# Patient Record
Sex: FEMALE | Race: BLACK OR AFRICAN AMERICAN | URBAN - METROPOLITAN AREA
[De-identification: names, ages, dates, MRNs, and addresses within clinical notes are randomized per-mention and may not be internally consistent; named-entity substitution may affect disease eponyms.]

---

## 2020-08-26 ENCOUNTER — OFFICE VISIT CONVERTED (OUTPATIENT)
Dept: OTOLARYNGOLOGY | Facility: CLINIC | Age: 40
End: 2020-08-26
Attending: NURSE PRACTITIONER

## 2020-08-26 ENCOUNTER — HOSPITAL ENCOUNTER (OUTPATIENT)
Dept: URGENT CARE | Facility: CLINIC | Age: 40
Discharge: HOME | End: 2020-08-26
Attending: EMERGENCY MEDICINE

## 2020-08-27 ENCOUNTER — OFFICE VISIT CONVERTED (OUTPATIENT)
Dept: OTOLARYNGOLOGY | Facility: CLINIC | Age: 40
End: 2020-08-27
Attending: OTOLARYNGOLOGY

## 2020-08-31 ENCOUNTER — OFFICE VISIT CONVERTED (OUTPATIENT)
Dept: OTOLARYNGOLOGY | Facility: CLINIC | Age: 40
End: 2020-08-31
Attending: OTOLARYNGOLOGY

## 2020-12-11 ENCOUNTER — HOSPITAL ENCOUNTER (OUTPATIENT)
Dept: URGENT CARE | Facility: CLINIC | Age: 40
Discharge: HOME | End: 2020-12-11
Attending: NURSE PRACTITIONER

## 2021-05-10 NOTE — H&P
History and Physical      Patient Name: Oliva Jaeger   Patient ID: [id number]   Sex: Female   YOB: 1980    Primary Care Provider: No PCP No PCP Other   Referring Provider: Wilmer White    Visit Date: August 26, 2020    Provider: FIONA Felder   Location: [facility]   Location Address: [address] [place] [address], [address] [place] [address]  [id number]   Location Phone: [phone]          Chief Complaint     1.  Otitis externa, left       History Of Present Illness  Oliva Jaeger is a 40 year old female who presents to the office today as a consult from Wilmer White.      She presents to the clinic today for evaluation of her left ear.  She reports that 4 days ago she noticed some soreness when she used a Q-tip in her left ear that she described as mild.  The following day she began to have pain in the left ear.  This worsened yesterday to the point where she was having some clear otorrhea on the left side and excruciating pain.  She reports that she was unable to sleep last night because of the pain being so severe.  She used a heating pad with little relief of her symptoms.  She was seen by urgent care today who diagnosed her with a left otitis externa and gave her a Rocephin injection as well as a prescription for Augmentin.  She has used 1 dose of neomycin polymyxin hydrocortisone drops but reports that it was very painful to put the drops in.  She feels like her hearing on the left side is muffled.  She denies swimming recently but reports that she often wears ear buds throughout the day.       Past Medical History  Left ear pain         Past Surgical History  *Denies any surgical procedures         Medication List  amoxicillin 875 mg oral tablet; neomycin-polymyxin-HC 3.5-10,000-1 mg/mL-unit/mL-% otic (ear) solution         Allergy List  NO KNOWN DRUG ALLERGIES         Family Medical History  *No Known Family History         Social History  Tobacco (Former)         Review of  "Systems  · Constitutional  o Denies  o : fever, night sweats, weight loss  · Eyes  o Denies  o : discharge from eye, impaired vision  · HENT  o Admits  o : *See HPI  · Cardiovascular  o Denies  o : chest pain, irregular heart beats  · Respiratory  o Denies  o : shortness of breath, wheezing, coughing up blood  · Gastrointestinal  o Denies  o : heartburn, reflux, vomiting blood  · Genitourinary  o Denies  o : frequency  · Integument  o Denies  o : rash, skin dryness  · Neurologic  o Denies  o : seizures, loss of balance, loss of consciousness, dizziness  · Endocrine  o Denies  o : cold intolerance, heat intolerance  · Heme-Lymph  o Denies  o : easy bleeding, anemia      Vitals  Date Time BP Position Site L\R Cuff Size HR RR TEMP (F) WT  HT  BMI kg/m2 BSA m2 O2 Sat HC       08/26/2020 01:31 PM        98 265lbs 6oz 5'  4\" 45.55 2.33           Physical Examination  · Constitutional  o Appearance  o : well developed, well-nourished, alert and in no acute distress, voice clear and strong  · Head and Face  o Head  o :   § Inspection  § : no deformities or lesions  o Face  o :   § Inspection  § : No facial lesions; House-Brackmann I/VI bilaterally  § Palpation  § : No TMJ crepitus nor  muscle tenderness bilaterally  · Eyes  o Vision  o :   § Visual Fields  § : Extraocular movements are intact. No spontaneous or gaze-induced nystagmus.  o Conjunctivae  o : clear  o Sclerae  o : clear  o Pupils and Irises  o : pupils equal, round, and reactive to light.   · Ears, Nose, Mouth and Throat  o Ears  o :   § External Ears  § : appearance within normal limits, no lesions present  § Otoscopic Examination  § : Right external auditory canal and right tympanic membrane appearance within normal limits, without perforations. Middle ear is well aerated. Left auricle is very tender to palpation. Left external auditory canal is edematous and erythematous with some purulent otorrhea present. Otorrhea suctioned under the microscope " and otowick placed in canal. Ciprodex drops instilled  § Hearing  § : intact to conversational voice both ears  o Nose  o :   § External Nose  § : appearance normal  § Intranasal Exam  § : mucosa within normal limits, vestibules normal, no intranasal lesions present, septum midline, sinuses non tender to percussion  o Oral Cavity  o :   § Oral Mucosa  § : oral mucosa normal without pallor or cyanosis  § Lips  § : lip appearance normal  § Teeth  § : normal dentition for age  § Gums  § : gums pink, non-swollen, no bleeding present  § Tongue  § : tongue appearance normal; normal mobility  § Palate  § : hard palate normal, soft palate appearance normal with symmetric mobility  o Throat  o :   § Oropharynx  § : no inflammation or lesions present, tonsils within normal limits  · Neck  o Inspection/Palpation  o : normal appearance, no masses or tenderness, trachea midline; thyroid size normal, nontender, no nodules or masses present on palpation  · Respiratory  o Respiratory Effort  o : breathing unlabored  o Inspection of Chest  o : normal appearance, no retractions  · Lymphatic  o Neck  o : no lymphadenopathy present  o Supraclavicular Nodes  o : no lymphadenopathy present  o Preauricular Nodes  o : no lymphadenopathy present  · Skin and Subcutaneous Tissue  o General Inspection  o : Regarding face and neck - there are no rashes present, no lesions present, and no areas of discoloration  · Neurologic  o Cranial Nerves  o : cranial nerves II-XII are grossly intact bilaterally  o Gait and Station  o : normal gait, able to stand without diffculty  · Psychiatric  o Judgement and Insight  o : judgment and insight intact  o Mood and Affect  o : mood normal, affect appropriate          Assessment  · Other infective otitis externa, left ear     380.10/H60.392    Problems Reconciled  Plan  · Orders  o Cerumen Removal by Provider, Unilateral [facility] ([id number]) - 380.10/H60.392 - 08/26/2020  · Medications  o Ciprodex 0.3-0.1 % otic (ear)  drops,suspension   SIG: instill 3 drops in the affected ear TID X 7 days   DISP: (1) 7.5 ml drop btl with 0 refills  Prescribed on 08/26/2020     o Medications have been Reconciled  o Transition of Care or Provider Policy  · Instructions  o She presents to the clinic today for evaluation of her left ear. She reports that 4 days ago she noticed some soreness when she used a Q-tip in her left ear that she described as mild. The following day she began to have pain in the left ear. This worsened yesterday to the point where she was having some clear otorrhea on the left side and excruciating pain. She reports that she was unable to sleep last night because of the pain being so severe. She used a heating pad with little relief of her symptoms. She was seen by urgent care today who diagnosed her with a left otitis externa and gave her a Rocephin injection as well as a prescription for Augmentin. She has used 1 dose of neomycin polymyxin hydrocortisone drops but reports that it was very painful to put the drops in. She feels like her hearing on the left side is muffled. She denies swimming recently but reports that she often wears ear buds throughout the day. On examination today of the left ear her auricle is very tender to palpation. Left external auditory canal is edematous and erythematous with some purulent otorrhea present I was able to suction this out under the microscope. I did place an otowick in the left ear and instilled Ciprodex drops. She did not tolerate this procedure well and was in a lot of pain around the placement of the otowick. I will have her continue to use Ciprodex drops 3 times daily and see her back in 2 days to reevaluate the ear. I will have her alternate ibuprofen and Tylenol for the pain.  o Electronically Identified Patient Education Materials Provided Electronically            Electronically Signed by: FIONA Felder -Author on August 26, 2020 02:08:16 PM

## 2021-05-13 NOTE — PROGRESS NOTES
"   Progress Note      Patient Name: Oliva Jaeger   Patient ID: [id number]   Sex: Female   YOB: 1980    Primary Care Provider: No PCP No PCP Other   Referring Provider: Wilmer White    Visit Date: August 27, 2020    Provider: Chad García MD   Location: [facility]   Location Address: [facility] [employer] [place], [place]  [id number]          Chief Complaint     \"My ear is killing me.\"       History Of Present Illness  Oliva Jaeger is a 40 year old female who returns today for follow-up of a left otitis externa. She tells me that her severe pain started approximately 3 days ago in her left ear. It has steadily worsened and been associated with otorrhea and muffled hearing. She was seen at an urgent care yesterday where she was given a Rocephin shot and prescribed Augmentin and Polysporin drops. She was then seen by FIONA Felder and placed on Ciprodex after an ear work was placed on the left. She called me last night telling me she was having difficulty sleeping and in severe pain. She also reported that the wick had fallen out on her way home yesterday.       Past Medical History  Left ear pain; Otitis externa of left ear         Past Surgical History  *Denies any surgical procedures         Medication List  amoxicillin 875 mg oral tablet; Ciprodex 0.3-0.1 % otic (ear) drops,suspension; neomycin-polymyxin-HC 3.5-10,000-1 mg/mL-unit/mL-% otic (ear) solution         Allergy List  NO KNOWN DRUG ALLERGIES         Family Medical History  *No Known Family History         Social History  Tobacco (Former)         Review of Systems  · Constitutional  o Denies  o : fever, night sweats, weight loss  · Eyes  o Denies  o : discharge from eye, impaired vision  · HENT  o Admits  o : *See HPI  · Cardiovascular  o Denies  o : chest pain, irregular heart beats  · Respiratory  o Denies  o : shortness of breath, wheezing, coughing up blood  · Gastrointestinal  o Denies  o : " "heartburn, reflux, vomiting blood  · Genitourinary  o Denies  o : frequency  · Integument  o Denies  o : rash, skin dryness  · Neurologic  o Denies  o : seizures, loss of balance, loss of consciousness, dizziness  · Endocrine  o Denies  o : cold intolerance, heat intolerance  · Heme-Lymph  o Denies  o : easy bleeding, anemia      Vitals  Date Time BP Position Site L\R Cuff Size HR RR TEMP (F) WT  HT  BMI kg/m2 BSA m2 O2 Sat HC       08/27/2020 10:30 AM        97.9 262lbs 6oz 5'  4\" 45.04 2.32           Physical Examination  · Constitutional  o Appearance  o : well developed, well-nourished, alert and in no acute distress, voice clear and strong  · Head and Face  o Head  o :   § Inspection  § : no deformities or lesions  o Face  o :   § Inspection  § : No facial lesions; House-Brackmann I/VI bilaterally  § Palpation  § : No TMJ crepitus nor  muscle tenderness bilaterally  · Eyes  o Vision  o :   § Visual Fields  § : Extraocular movements are intact. No spontaneous or gaze-induced nystagmus.  o Conjunctivae  o : clear  o Sclerae  o : clear  o Pupils and Irises  o : pupils equal, round, and reactive to light.   · Ears, Nose, Mouth and Throat  o Ears  o :   § External Ears  § : appearance within normal limits, no lesions present  § Otoscopic Examination  § : Right external auditory canal and tympanic membrane are normal in appearance. Left external auditory canal is edematous and erythematous with medial canal purulence. This was suctioned and 3 otowicks placed. Ciprodex drops were then instilled.  § Hearing  § : intact to conversational voice both ears  o Nose  o :   § External Nose  § : appearance normal  § Intranasal Exam  § : mucosa within normal limits, vestibules normal, no intranasal lesions present, septum midline, sinuses non tender to percussion  o Oral Cavity  o :   § Oral Mucosa  § : oral mucosa normal without pallor or cyanosis  § Lips  § : lip appearance normal  § Teeth  § : normal dentition for " age  § Gums  § : gums pink, non-swollen, no bleeding present  § Tongue  § : tongue appearance normal; normal mobility  § Palate  § : hard palate normal, soft palate appearance normal with symmetric mobility  o Throat  o :   § Oropharynx  § : no inflammation or lesions present, tonsils within normal limits  · Neck  o Inspection/Palpation  o : normal appearance, no masses or tenderness, trachea midline; thyroid size normal, nontender, no nodules or masses present on palpation  · Respiratory  o Respiratory Effort  o : breathing unlabored  o Inspection of Chest  o : normal appearance, normal appearance, no retractions  · Lymphatic  o Neck  o : no lymphadenopathy present  o Supraclavicular Nodes  o : no lymphadenopathy present  o Preauricular Nodes  o : no lymphadenopathy present  · Skin and Subcutaneous Tissue  o General Inspection  o : Regarding face and neck - there are no rashes present, no lesions present, and no areas of discoloration  · Neurologic  o Cranial Nerves  o : cranial nerves II-XII are grossly intact bilaterally  o Gait and Station  o : normal gait, able to stand without diffculty  · Psychiatric  o Judgement and Insight  o : judgment and insight intact  o Mood and Affect  o : mood normal, affect appropriate          Assessment  · Otitis externa     380.10/H60.90    Problems Reconciled  Plan  · Medications  o ibuprofen 800 mg oral tablet   SIG: take 1 tablet (800 mg) by oral route 4 times per day with food for 14 days   DISP: (56) tablets with 0 refills  Prescribed on 08/27/2020     o Medications have been Reconciled  o Transition of Care or Provider Policy  · Instructions  o Impressions and findings were discussed with Mrs. Jaeger at great length. We discussed the pathophysiology and natural history of otitis externa as well as potential causes. 3 otowicks were placed in the left external auditory canal and she will continue with Ciprodex. She was provided Norco and ibuprofen for pain relief. She  understands that oral antibiotics were not helpful for this and that nothing will take the pain away completely. She is going to follow-up on Monday for potential debridement and follow-up.            Electronically Signed by: Chad García MD -Author on August 27, 2020 11:22:43 AM

## 2021-05-13 NOTE — PROGRESS NOTES
"   Progress Note      Patient Name: Oliva Jaeger   Patient ID: [id number]   Sex: Female   YOB: 1980    Primary Care Provider: No PCP No PCP Other   Referring Provider: Wilmer White    Visit Date: August 31, 2020    Provider: Chad García MD   Location: [facility]   Location Address: [address] [place] [address], [address] [place] [address]  [id number]   Location Phone: [phone]          Chief Complaint     \"It is doing much better.\"       History Of Present Illness  Oliva Jaeger is a 40 year old female who returns today for follow-up of left-sided acute otitis externa. She was originally seen on 8/27/2020 at which time she had been experiencing severe pain in her left ear for 3 days which was also associated with otorrhea and muffled hearing. At her last visit there was significant left external auditory canal edema and erythema as well as purulence. 3 otowicks were placed and she was continued on Ciprodex. She was prescribed Norco and ibuprofen for pain. She tells me that her pain has improved significantly since her last visit. The ear hemant fell out last night. She tells me that she has not noticed any further otorrhea but does feel like her hearing is still somewhat muffled. She denies any vertigo, fever, chills, or night sweats.       Past Medical History  Left ear pain; Otitis externa of left ear         Past Surgical History  *Denies any surgical procedures         Medication List  amoxicillin 875 mg oral tablet; Ciprodex 0.3-0.1 % otic (ear) drops,suspension; ibuprofen 800 mg oral tablet         Allergy List  NO KNOWN DRUG ALLERGIES         Family Medical History  *No Known Family History         Social History  Tobacco (Former)         Review of Systems  · Constitutional  o Denies  o : fever, night sweats, weight loss  · Eyes  o Denies  o : discharge from eye, impaired vision  · HENT  o Admits  o : *See HPI  · Cardiovascular  o Denies  o : chest pain, irregular heart " "beats  · Respiratory  o Denies  o : shortness of breath, wheezing, coughing up blood  · Gastrointestinal  o Denies  o : heartburn, reflux, vomiting blood  · Genitourinary  o Denies  o : frequency  · Integument  o Denies  o : rash, skin dryness  · Neurologic  o Denies  o : seizures, loss of balance, loss of consciousness, dizziness  · Endocrine  o Denies  o : cold intolerance, heat intolerance  · Heme-Lymph  o Denies  o : easy bleeding, anemia      Vitals  Date Time BP Position Site L\R Cuff Size HR RR TEMP (F) WT  HT  BMI kg/m2 BSA m2 O2 Sat HC       08/31/2020 09:59 AM        97.4 268lbs 0oz 5'  4\" 46 2.34           Physical Examination  · Constitutional  o Appearance  o : well developed, well-nourished, alert and in no acute distress, voice clear and strong  · Head and Face  o Head  o :   § Inspection  § : no deformities or lesions  o Face  o :   § Inspection  § : No facial lesions; House-Brackmann I/VI bilaterally  § Palpation  § : No TMJ crepitus nor  muscle tenderness bilaterally  · Eyes  o Vision  o :   § Visual Fields  § : Extraocular movements are intact. No spontaneous or gaze-induced nystagmus.  o Conjunctivae  o : clear  o Sclerae  o : clear  o Pupils and Irises  o : pupils equal, round, and reactive to light.   · Ears, Nose, Mouth and Throat  o Ears  o :   § External Ears  § : appearance within normal limits, no lesions present  § Otoscopic Examination  § : Right external auditory canal and tympanic membrane are normal in appearance. Left external auditory canal with moderate medial edema and slight purulence which was suctioned using the operating microscope and a #3 suction. Ciprodex drops were instilled.  § Hearing  § : intact to conversational voice both ears  o Nose  o :   § External Nose  § : appearance normal  § Intranasal Exam  § : mucosa within normal limits, vestibules normal, no intranasal lesions present, septum midline, sinuses non tender to percussion  o Oral Cavity  o :   § Oral " Mucosa  § : oral mucosa normal without pallor or cyanosis  § Lips  § : lip appearance normal  § Teeth  § : normal dentition for age  § Gums  § : gums pink, non-swollen, no bleeding present  § Tongue  § : tongue appearance normal; normal mobility  § Palate  § : hard palate normal, soft palate appearance normal with symmetric mobility  o Throat  o :   § Oropharynx  § : no inflammation or lesions present, tonsils within normal limits  o Nasopharyngoscopy  o : The patients nares were anesthetized with Lidocaine with Afrin. After this was done, the flexible nasopharyngoscope was passed through both the left and right sides.  · Neck  o Inspection/Palpation  o : normal appearance, no masses or tenderness, trachea midline; thyroid size normal, nontender, no nodules or masses present on palpation  · Respiratory  o Respiratory Effort  o : breathing unlabored  o Inspection of Chest  o : normal appearance, normal appearance, no retractions  · Lymphatic  o Neck  o : no lymphadenopathy present  o Supraclavicular Nodes  o : no lymphadenopathy present  o Preauricular Nodes  o : no lymphadenopathy present  · Skin and Subcutaneous Tissue  o General Inspection  o : Regarding face and neck - there are no rashes present, no lesions present, and no areas of discoloration  · Neurologic  o Cranial Nerves  o : cranial nerves II-XII are grossly intact bilaterally  o Gait and Station  o : normal gait, able to stand without diffculty  · Psychiatric  o Judgement and Insight  o : judgment and insight intact  o Mood and Affect  o : mood normal, affect appropriate          Assessment  · Otitis externa     380.10/H60.90    Problems Reconciled  Plan  · Orders  o Microscopic exam [facility] ([id number]) - 380.10/H60.90 - 08/31/2020  · Medications  o Medications have been Reconciled  o Transition of Care or Provider Policy  · Instructions  o Impressions and findings were discussed Mrs. Jaeger at great. Currently, she is much improved in regards to her acute left  otitis externa. The hemant have fallen out and her medial canal was debrided today in clinic. She will follow-up in 1 week and is to continue twice a day Ciprodex drops until that time. We again discussed strict water precautions.            Electronically Signed by: Chad García MD -Author on August 31, 2020 10:32:08 AM

## 2021-05-14 VITALS — BODY MASS INDEX: 45.3 KG/M2 | WEIGHT: 265.37 LBS | HEIGHT: 64 IN | TEMPERATURE: 98 F

## 2021-05-14 VITALS — TEMPERATURE: 97.4 F | HEIGHT: 64 IN | WEIGHT: 268 LBS | BODY MASS INDEX: 45.75 KG/M2

## 2021-05-14 VITALS — TEMPERATURE: 97.9 F | HEIGHT: 64 IN | WEIGHT: 262.37 LBS | BODY MASS INDEX: 44.79 KG/M2

## 2024-03-08 ENCOUNTER — TELEPHONE (OUTPATIENT)
Dept: PEDIATRICS | Facility: OTHER | Age: 44
End: 2024-03-08
Payer: COMMERCIAL

## 2024-03-08 NOTE — TELEPHONE ENCOUNTER
Caller: Oliva Jaeger    Relationship: Self    Best call back number: [phone]    Who are you requesting to speak with (clinical staff, provider,  specific staff member): CLINICAL STAFF    What was the call regarding: STATED THAT THEY HAD BEEN REFERRED TO DR. BALLESTEROS BY THEIR DIRECTOR OF NURSING, MARCOS WILKERSON. STATED THAT THEY WOULD LIKE TO SEE HIM FOR FOOT AND LEG ISSUES THEY HAVE BEEN EXPERIENCING FOR A WHILE. STATED THAT THEY WOULD LIKE TO GET TO HIM SINCE HE WAS RECOMMENDED TO THEM. PLEASE CALL AND ADVISE

## 2024-03-11 ENCOUNTER — OFFICE VISIT (OUTPATIENT)
Dept: FAMILY MEDICINE CLINIC | Facility: CLINIC | Age: 44
End: 2024-03-11
Payer: COMMERCIAL

## 2024-03-11 VITALS
WEIGHT: 256 LBS | DIASTOLIC BLOOD PRESSURE: 80 MMHG | BODY MASS INDEX: 43.71 KG/M2 | HEIGHT: 64 IN | HEART RATE: 98 BPM | SYSTOLIC BLOOD PRESSURE: 132 MMHG | OXYGEN SATURATION: 100 %

## 2024-03-11 DIAGNOSIS — M54.31 SCIATICA, RIGHT SIDE: Primary | ICD-10-CM

## 2024-03-11 DIAGNOSIS — E55.9 VITAMIN D DEFICIENCY: ICD-10-CM

## 2024-03-11 DIAGNOSIS — E53.8 VITAMIN B12 DEFICIENCY: ICD-10-CM

## 2024-03-11 DIAGNOSIS — Z13.220 LIPID SCREENING: ICD-10-CM

## 2024-03-11 DIAGNOSIS — R63.4 WEIGHT LOSS DUE TO MEDICATION: ICD-10-CM

## 2024-03-11 DIAGNOSIS — Z13.1 DIABETES MELLITUS SCREENING: ICD-10-CM

## 2024-03-11 DIAGNOSIS — T50.905A WEIGHT LOSS DUE TO MEDICATION: ICD-10-CM

## 2024-03-11 DIAGNOSIS — I1A.0 RESISTANT HYPERTENSION: ICD-10-CM

## 2024-03-11 RX ORDER — TOPIRAMATE 25 MG/1
25 TABLET ORAL 2 TIMES DAILY
Qty: 30 TABLET | Refills: 6 | Status: SHIPPED | OUTPATIENT
Start: 2024-03-11

## 2024-03-11 RX ORDER — KETOROLAC TROMETHAMINE 30 MG/ML
30 INJECTION, SOLUTION INTRAMUSCULAR; INTRAVENOUS ONCE
Status: COMPLETED | OUTPATIENT
Start: 2024-03-11 | End: 2024-03-11

## 2024-03-11 RX ORDER — IBUPROFEN 800 MG/1
800 TABLET ORAL EVERY 6 HOURS PRN
Qty: 90 TABLET | Refills: 3 | Status: SHIPPED | OUTPATIENT
Start: 2024-03-11

## 2024-03-11 RX ORDER — LISINOPRIL 10 MG/1
5 TABLET ORAL DAILY
Qty: 30 TABLET | Refills: 5 | Status: SHIPPED | OUTPATIENT
Start: 2024-03-11

## 2024-03-11 RX ORDER — CYCLOBENZAPRINE HCL 10 MG
10 TABLET ORAL 3 TIMES DAILY PRN
Qty: 60 TABLET | Refills: 1 | Status: SHIPPED | OUTPATIENT
Start: 2024-03-11

## 2024-03-11 RX ORDER — TRIAMCINOLONE ACETONIDE 40 MG/ML
40 INJECTION, SUSPENSION INTRA-ARTICULAR; INTRAMUSCULAR ONCE
Status: COMPLETED | OUTPATIENT
Start: 2024-03-11 | End: 2024-03-11

## 2024-03-11 RX ORDER — PHENTERMINE HYDROCHLORIDE 37.5 MG/1
37.5 CAPSULE ORAL EVERY MORNING
Qty: 30 CAPSULE | Refills: 1 | Status: SHIPPED | OUTPATIENT
Start: 2024-03-11 | End: 2024-03-15

## 2024-03-11 RX ADMIN — TRIAMCINOLONE ACETONIDE 40 MG: 40 INJECTION, SUSPENSION INTRA-ARTICULAR; INTRAMUSCULAR at 09:17

## 2024-03-11 RX ADMIN — KETOROLAC TROMETHAMINE 30 MG: 30 INJECTION, SOLUTION INTRAMUSCULAR; INTRAVENOUS at 09:15

## 2024-03-11 NOTE — PROGRESS NOTES
"Subjective     Oliva Jaeger is a 43 y.o. female. Presents today for   Chief Complaint   Patient presents with    Leg Pain     Both legs  rt leg has been hurting for a while  coming from back with sciatica and pain going all the way down leg     Now has pain in heel of rt leg in return causing pain in left leg       New patient here to establish care    Sciatica Right leg, plantar fasciitis right foot and left leg pain along with weight loss      There is no problem list on file for this patient.    Past Medical History:   Diagnosis Date    Hypertension      History reviewed. No pertinent surgical history.  Family History   Problem Relation Age of Onset    Hypertension Mother     Hypertension Father     Diabetes Father      Social History     Socioeconomic History    Marital status: Single   Tobacco Use    Smoking status: Every Day     Current packs/day: 1.00     Types: Cigarettes    Smokeless tobacco: Never   Vaping Use    Vaping status: Never Used   Substance and Sexual Activity    Alcohol use: Yes     Comment: social    Drug use: Never    Sexual activity: Defer       No Known Allergies    Current Outpatient Medications on File Prior to Visit   Medication Sig Dispense Refill    Probiotic Product (PROBIOTIC BLEND PO) Take  by mouth.       No current facility-administered medications on file prior to visit.       Objective   Vitals:    03/11/24 0825   BP: 132/80   Pulse: 98   SpO2: 100%   Weight: 116 kg (256 lb)   Height: 162.6 cm (64\")     Body mass index is 43.94 kg/m².  Physical Exam  Constitutional:       Appearance: She is obese.   HENT:      Head: Normocephalic and atraumatic.      Mouth/Throat:      Mouth: Mucous membranes are moist.   Eyes:      Pupils: Pupils are equal, round, and reactive to light.   Cardiovascular:      Rate and Rhythm: Normal rate and regular rhythm.      Pulses: Normal pulses.      Heart sounds: Normal heart sounds.   Pulmonary:      Effort: Pulmonary effort is normal.      Breath " sounds: Normal breath sounds.   Abdominal:      General: Bowel sounds are normal.   Musculoskeletal:         General: Normal range of motion.      Cervical back: Normal range of motion.   Skin:     General: Skin is warm and dry.      Capillary Refill: Capillary refill takes less than 2 seconds.   Neurological:      General: No focal deficit present.      Mental Status: She is alert.   Psychiatric:         Mood and Affect: Mood normal.     Procedures     Assessment & Plan   Diagnoses and all orders for this visit:    1. Sciatica, right side (Primary)  -     ketorolac (TORADOL) injection 30 mg  -     triamcinolone acetonide (KENALOG-40) injection 40 mg  -     cyclobenzaprine (FLEXERIL) 10 MG tablet; Take 1 tablet by mouth 3 (Three) Times a Day As Needed for Muscle Spasms.  Dispense: 60 tablet; Refill: 1  -     ibuprofen (ADVIL,MOTRIN) 800 MG tablet; Take 1 tablet by mouth Every 6 (Six) Hours As Needed for Mild Pain.  Dispense: 90 tablet; Refill: 3    2. Resistant hypertension  -     lisinopril (PRINIVIL,ZESTRIL) 10 MG tablet; Take 0.5 tablets by mouth Daily.  Dispense: 30 tablet; Refill: 5    3. Weight loss due to medication  -     phentermine 37.5 MG capsule; Take 1 capsule by mouth Every Morning.  Dispense: 30 capsule; Refill: 1  -     ToxASSURE Select 13 (MW) - Urine, Clean Catch  -     topiramate (TOPAMAX) 25 MG tablet; Take 1 tablet by mouth 2 (Two) Times a Day.  Dispense: 30 tablet; Refill: 6                Class 3 Severe Obesity (BMI >=40). Obesity-related health conditions include the following: hypertension. Obesity is unchanged. BMI is  elevated . We discussed low calorie, low carb based diet program, portion control, increasing exercise, and joining a fitness center or start home based exercise program.     No follow-ups on file.

## 2024-03-12 LAB
25(OH)D3+25(OH)D2 SERPL-MCNC: 8.3 NG/ML (ref 30–100)
ALBUMIN SERPL-MCNC: 4.3 G/DL (ref 3.5–5.2)
ALBUMIN/GLOB SERPL: 1.6 G/DL
ALP SERPL-CCNC: 110 U/L (ref 39–117)
ALT SERPL-CCNC: 13 U/L (ref 1–33)
AST SERPL-CCNC: 14 U/L (ref 1–32)
BASOPHILS # BLD AUTO: 0.05 10*3/MM3 (ref 0–0.2)
BASOPHILS NFR BLD AUTO: 0.5 % (ref 0–1.5)
BILIRUB SERPL-MCNC: <0.2 MG/DL (ref 0–1.2)
BUN SERPL-MCNC: 11 MG/DL (ref 6–20)
BUN/CREAT SERPL: 13.6 (ref 7–25)
CALCIUM SERPL-MCNC: 9.4 MG/DL (ref 8.6–10.5)
CHLORIDE SERPL-SCNC: 106 MMOL/L (ref 98–107)
CHOLEST SERPL-MCNC: 222 MG/DL (ref 0–200)
CO2 SERPL-SCNC: 24.8 MMOL/L (ref 22–29)
CREAT SERPL-MCNC: 0.81 MG/DL (ref 0.57–1)
EGFRCR SERPLBLD CKD-EPI 2021: 93.7 ML/MIN/1.73
EOSINOPHIL # BLD AUTO: 0.1 10*3/MM3 (ref 0–0.4)
EOSINOPHIL NFR BLD AUTO: 0.9 % (ref 0.3–6.2)
ERYTHROCYTE [DISTWIDTH] IN BLOOD BY AUTOMATED COUNT: 17.1 % (ref 12.3–15.4)
GLOBULIN SER CALC-MCNC: 2.7 GM/DL
GLUCOSE SERPL-MCNC: 71 MG/DL (ref 65–99)
HBA1C MFR BLD: 5.9 % (ref 4.8–5.6)
HCT VFR BLD AUTO: 38.4 % (ref 34–46.6)
HDLC SERPL-MCNC: 48 MG/DL (ref 40–60)
HGB BLD-MCNC: 11.2 G/DL (ref 12–15.9)
IMM GRANULOCYTES # BLD AUTO: 0.03 10*3/MM3 (ref 0–0.05)
IMM GRANULOCYTES NFR BLD AUTO: 0.3 % (ref 0–0.5)
LDLC SERPL CALC-MCNC: 143 MG/DL (ref 0–100)
LYMPHOCYTES # BLD AUTO: 2.51 10*3/MM3 (ref 0.7–3.1)
LYMPHOCYTES NFR BLD AUTO: 23.1 % (ref 19.6–45.3)
MCH RBC QN AUTO: 21.9 PG (ref 26.6–33)
MCHC RBC AUTO-ENTMCNC: 29.2 G/DL (ref 31.5–35.7)
MCV RBC AUTO: 75 FL (ref 79–97)
MONOCYTES # BLD AUTO: 1.12 10*3/MM3 (ref 0.1–0.9)
MONOCYTES NFR BLD AUTO: 10.3 % (ref 5–12)
NEUTROPHILS # BLD AUTO: 7.07 10*3/MM3 (ref 1.7–7)
NEUTROPHILS NFR BLD AUTO: 64.9 % (ref 42.7–76)
NRBC BLD AUTO-RTO: 0 /100 WBC (ref 0–0.2)
PLATELET # BLD AUTO: 478 10*3/MM3 (ref 140–450)
POTASSIUM SERPL-SCNC: 4.1 MMOL/L (ref 3.5–5.2)
PROT SERPL-MCNC: 7 G/DL (ref 6–8.5)
RBC # BLD AUTO: 5.12 10*6/MM3 (ref 3.77–5.28)
SODIUM SERPL-SCNC: 143 MMOL/L (ref 136–145)
TRIGL SERPL-MCNC: 171 MG/DL (ref 0–150)
VIT B12 SERPL-MCNC: 592 PG/ML (ref 211–946)
VLDLC SERPL CALC-MCNC: 31 MG/DL (ref 5–40)
WBC # BLD AUTO: 10.88 10*3/MM3 (ref 3.4–10.8)

## 2024-03-12 RX ORDER — ERGOCALCIFEROL 1.25 MG/1
50000 CAPSULE ORAL WEEKLY
Qty: 5 CAPSULE | Refills: 11 | Status: SHIPPED | OUTPATIENT
Start: 2024-03-12

## 2024-03-15 DIAGNOSIS — R63.4 WEIGHT LOSS: Primary | ICD-10-CM

## 2024-03-15 RX ORDER — PHENTERMINE HYDROCHLORIDE 37.5 MG/1
37.5 TABLET ORAL
Qty: 30 TABLET | Refills: 2 | Status: SHIPPED | OUTPATIENT
Start: 2024-03-15

## 2024-03-19 LAB — DRUGS UR: NORMAL

## 2024-08-23 ENCOUNTER — TELEMEDICINE (OUTPATIENT)
Dept: FAMILY MEDICINE CLINIC | Facility: CLINIC | Age: 44
End: 2024-08-23
Payer: COMMERCIAL

## 2024-08-23 DIAGNOSIS — J01.40 ACUTE NON-RECURRENT PANSINUSITIS: Primary | ICD-10-CM

## 2024-08-23 DIAGNOSIS — E55.9 VITAMIN D DEFICIENCY: ICD-10-CM

## 2024-08-23 DIAGNOSIS — B37.31 VAGINAL CANDIDIASIS: ICD-10-CM

## 2024-08-23 DIAGNOSIS — M54.31 SCIATICA, RIGHT SIDE: ICD-10-CM

## 2024-08-23 PROCEDURE — 99213 OFFICE O/P EST LOW 20 MIN: CPT

## 2024-08-23 RX ORDER — IBUPROFEN 800 MG/1
800 TABLET ORAL EVERY 6 HOURS PRN
Qty: 90 TABLET | Refills: 3 | Status: SHIPPED | OUTPATIENT
Start: 2024-08-23

## 2024-08-23 RX ORDER — FLUCONAZOLE 150 MG/1
150 TABLET ORAL AS NEEDED
Qty: 2 TABLET | Refills: 0 | Status: SHIPPED | OUTPATIENT
Start: 2024-08-23

## 2024-08-23 RX ORDER — AMOXICILLIN AND CLAVULANATE POTASSIUM 875; 125 MG/1; MG/1
1 TABLET, FILM COATED ORAL 2 TIMES DAILY
Qty: 14 TABLET | Refills: 0 | Status: SHIPPED | OUTPATIENT
Start: 2024-08-23 | End: 2024-08-30

## 2024-08-23 RX ORDER — ERGOCALCIFEROL 1.25 MG/1
50000 CAPSULE ORAL WEEKLY
Qty: 5 CAPSULE | Refills: 11 | Status: SHIPPED | OUTPATIENT
Start: 2024-08-23

## 2024-08-23 RX ORDER — METHYLPREDNISOLONE 4 MG/1
TABLET ORAL
Qty: 21 TABLET | Refills: 0 | Status: SHIPPED | OUTPATIENT
Start: 2024-08-23

## 2024-08-23 NOTE — PROGRESS NOTES
Subjective   Oliva Jaeger is a 44 y.o. female.  Presenting today for a MyChart video visit. Patient's location at home, and provider's location in-office at [facility]. Patient gave consent to discuss concerns via Telemedicine.      Chief Complaint   Patient presents with    URI     History of Present Illness     Sinusitis: Patient states that symptoms began Tuesday.  She reports pain with speaking and inspiration toward the lower left quadrant of her chest.  Reports intermittent shortness of air, feels that the left side of her head is congested, has sore throat, fatigue, right ear pain.  Denies fever/chills.  She works in a nursing home.  She is eating and drinking well.  No nausea/vomiting/diarrhea.  No history pneumonia.  She has been taking sinus pills over-the-counter.    The following portions of the patient's history were reviewed and updated as appropriate: allergies, current medications, past family history, past medical history, past social history, past surgical history and problem list.    Review of Systems   Denies dizziness, fever/chills, blood in urine/stool, abd pain, leg swelling.    Objective     There were no vitals filed for this visit.   There is no height or weight on file to calculate BMI.    Physical Exam  Constitutional:       Appearance: Normal appearance.   Neurological:      Mental Status: She is alert.   Psychiatric:         Mood and Affect: Mood normal.         Behavior: Behavior normal.         Thought Content: Thought content normal.         Judgment: Judgment normal.       Assessment & Plan   Diagnoses and all orders for this visit:    1. Acute non-recurrent pansinusitis (Primary)  -     amoxicillin-clavulanate (AUGMENTIN) 875-125 MG per tablet; Take 1 tablet by mouth 2 (Two) Times a Day for 7 days.  Dispense: 14 tablet; Refill: 0  -     methylPREDNISolone (MEDROL) 4 MG dose pack; Take as directed on package instructions.  Dispense: 21 tablet; Refill: 0    2. Sciatica,  right side  -     ibuprofen (ADVIL,MOTRIN) 800 MG tablet; Take 1 tablet by mouth Every 6 (Six) Hours As Needed for Mild Pain.  Dispense: 90 tablet; Refill: 3    3. Vitamin D deficiency  -     vitamin D (ERGOCALCIFEROL) 1.25 MG (57034 UT) capsule capsule; Take 1 capsule by mouth 1 (One) Time Per Week.  Dispense: 5 capsule; Refill: 11    4. Vaginal candidiasis  -     fluconazole (Diflucan) 150 MG tablet; Take 1 tablet by mouth As Needed (Take once as needed. May repeat in 3 days if symptoms persist). Take 1 tablet only AFTER antibiotic if symptoms occur. May repeat in 3 days if symptoms fail to improve.  Dispense: 2 tablet; Refill: 0      Plan:     Take prescribed medications per instructions.   Take OTC medications such as mucinex, flonase as needed for symptom management. Rest, increase water intake.   Follow up if symptoms worsen or fail to improve    Discussed Care Gaps, ordered referrals and encouraged vaccination updates.       - Pt agrees with plan of care and denies further questions/concerns today  - This document is intended for medical expert use only. Persons  reading this document without medical staff guidance may result in misinterpretation and unintended morbidity     Go to the ER for any possible life-threatening symptoms such as chest pain or shortness of air.      Please allow 3-5 business days for recommendations based on new results      I personally spent time with this patient, preparing for the visit, reviewing tests, obtaining and/or reviewing a separately obtained history, performing a medically appropriate examination and/or evaluation, counseling and educating the patient/family/caregiver, ordering medications,  documenting information in the medical record and indepentently interpreting results.

## 2024-08-26 ENCOUNTER — TELEPHONE (OUTPATIENT)
Dept: FAMILY MEDICINE CLINIC | Facility: CLINIC | Age: 44
End: 2024-08-26
Payer: COMMERCIAL

## 2024-08-26 NOTE — TELEPHONE ENCOUNTER
Pt had a [facility]to[facility] video visit with Latisha on 08/23/2024. She has not got in better. Pt states she has sob after talking and backpain. Patient does not want to do another telehealth appt and would like Dr. Cotto to look into it and advise what to do next. Patient requesting a call back from nurse.  [phone]

## 2024-08-27 NOTE — TELEPHONE ENCOUNTER
Patient called back and upset we have not contacted her. Patient does not want to see Latisha again. Prefers to see Dr. Cotto. Patient is not better.

## 2024-08-27 NOTE — TELEPHONE ENCOUNTER
Ok For Hub to Relay     Still waiting for a response. If patient is still not feeling better we can schedule her to come in to see an APRN

## 2025-02-07 ENCOUNTER — OFFICE VISIT (OUTPATIENT)
Dept: FAMILY MEDICINE CLINIC | Facility: CLINIC | Age: 45
End: 2025-02-07
Payer: COMMERCIAL

## 2025-02-07 VITALS
DIASTOLIC BLOOD PRESSURE: 96 MMHG | WEIGHT: 237.6 LBS | HEART RATE: 101 BPM | SYSTOLIC BLOOD PRESSURE: 148 MMHG | HEIGHT: 64 IN | BODY MASS INDEX: 40.56 KG/M2

## 2025-02-07 DIAGNOSIS — R42 VERTIGO: Primary | ICD-10-CM

## 2025-02-07 DIAGNOSIS — I10 ESSENTIAL (PRIMARY) HYPERTENSION: ICD-10-CM

## 2025-02-07 DIAGNOSIS — E55.9 VITAMIN D DEFICIENCY: ICD-10-CM

## 2025-02-07 PROCEDURE — 99213 OFFICE O/P EST LOW 20 MIN: CPT

## 2025-02-07 RX ORDER — ERGOCALCIFEROL 1.25 MG/1
50000 CAPSULE, LIQUID FILLED ORAL WEEKLY
Qty: 5 CAPSULE | Refills: 11 | Status: SHIPPED | OUTPATIENT
Start: 2025-02-07

## 2025-02-07 RX ORDER — MECLIZINE HYDROCHLORIDE 25 MG/1
25 TABLET ORAL 3 TIMES DAILY PRN
Qty: 90 TABLET | Refills: 0 | Status: SHIPPED | OUTPATIENT
Start: 2025-02-07

## 2025-02-07 RX ORDER — LISINOPRIL 10 MG/1
5 TABLET ORAL DAILY
Qty: 90 TABLET | Refills: 0 | Status: SHIPPED | OUTPATIENT
Start: 2025-02-07

## 2025-02-07 NOTE — LETTER
February 7, 2025     Patient: Oliva Jaeger   YOB: 1980   Date of Visit: 2/7/2025     To Whom it May Concern:       I have seen and evaluated Ms. Jaeger in my clinic today. Please do not hesitate to contact me with any further questions.         Kind Regards,    FIONA Rider

## 2025-02-07 NOTE — PROGRESS NOTES
"Subjective   Oliva Jaeger is a 44 y.o. female.     Patient or patient representative verbalized consent for the use of Ambient Listening during the visit with  FIONA Rider for chart documentation. 2/7/2025  18:19 EST    Chief Complaint   Patient presents with    Dizziness     2 DAYS AGO WHEN GOT OUT OF BED WAS DIZZY     BLOOD PRESSURE  150/84 P142.    HAD NOT BEEN TAKING LISINOPRIL BUT TOOK PAST 2 DAYS POSITIONAL DIZZINESS       History of Present Illness  The patient is a 44-year-old female who presents for evaluation of dizziness and elevated blood pressure.    Dizziness  She experienced an episode of dizziness upon awakening on Wednesday, which persisted even after a period of rest. She has been under significant stress due to work-related issues, including an acquisition. She describes her dizziness as a sensation of spinning, akin to being on a carousel. She reports no associated nausea, hearing issues, tinnitus, or headaches. She also reports no leg swelling or weakness. She has not attempted any home maneuvers for vertigo. She reports feeling slightly off balance when walking due to her dizziness. She has no history of vertigo. She has been experiencing visual disturbances, such as difficulty focusing on her phone screen, and occasional lightheadedness when bending down and standing up. She also reports seeing \"little stars\" at times. She has been experiencing fatigue due to inadequate sleep, averaging 4 to 5 hours per night during the week and catching up on sleep over the weekends. She does not consume alcohol or caffeine regularly. Her dizziness is exacerbated by changes in position, such as going from lying to sitting or turning her head, and is more pronounced when lying on her left side.  - Onset: Upon awakening on Wednesday.  - Duration: Persistent even after a period of rest.  - Character: Sensation of spinning, akin to being on a carousel.  - Alleviating/Aggravating Factors: Exacerbated by " "changes in position, such as going from lying to sitting or turning her head, and more pronounced when lying on her left side.  - Timing: More pronounced when lying on her left side.  - Severity: Feeling slightly off balance when walking, visual disturbances, occasional lightheadedness, and seeing \"little stars\" at times.    Elevated Blood Pressure  She measured her blood pressure manually using an arm cuff on her left arm and found it to be elevated. She had been prescribed a low dose of lisinopril 10 mg for hypertension but had not been taking it consistently as her blood pressure had been stable. She took the medication on Wednesday and Thursday but not today. She has not checked her blood pressure since the initial reading. She has 15 tablets of lisinopril 10 mg remaining and is requesting a refill. She recalls being on a higher dose of blood pressure medication in the past, along with a diuretic, but does not remember the specific medications.  - Onset: Not specified.  - Duration: Not specified.  - Character: Elevated blood pressure.  - Alleviating/Aggravating Factors: Inconsistent use of lisinopril 10 mg.  - Timing: Took medication on Wednesday and Thursday but not today.  - Severity: Elevated blood pressure reading.    She is requesting a refill of her vitamin D prescription.    Supplemental Information  She reported nasal congestion on the same day, which she attributes to the use of a heater.    SOCIAL HISTORY  - Does not drink alcohol    FAMILY HISTORY  - Mother has experienced vertigo    MEDICATIONS  - Current: Lisinopril, vitamin D       The following portions of the patient's history were reviewed and updated as appropriate: allergies, current medications, past family history, past medical history, past social history, past surgical history and problem list.    Results         Objective     Vitals:    02/07/25 1757   BP: 148/96   Pulse: 101      Body mass index is 40.78 kg/m².    Physical Exam  Vitals " reviewed.   Constitutional:       Appearance: Normal appearance. She is obese.   HENT:      Right Ear: Tympanic membrane, ear canal and external ear normal.      Left Ear: Tympanic membrane, ear canal and external ear normal.      Mouth/Throat:      Mouth: Mucous membranes are moist.      Pharynx: Oropharynx is clear. No oropharyngeal exudate or posterior oropharyngeal erythema.   Eyes:      Extraocular Movements: Extraocular movements intact.      Conjunctiva/sclera: Conjunctivae normal.      Pupils: Pupils are equal, round, and reactive to light.   Cardiovascular:      Rate and Rhythm: Normal rate and regular rhythm.   Pulmonary:      Effort: Pulmonary effort is normal.      Breath sounds: Normal breath sounds.   Skin:     General: Skin is warm and dry.   Neurological:      Mental Status: She is alert and oriented to person, place, and time.      Sensory: No sensory deficit.      Motor: No weakness.      Coordination: Coordination normal.      Gait: Gait normal.      Comments: Positive Bob White-Hallpike (left)   Psychiatric:         Behavior: Behavior normal.       Assessment & Plan  1. Vertigo  - Reports dizziness exacerbated by changes in position and direction, with associated visual strain and a sensation of spinning  - Vertigo test positive, particularly when lying on the left side  - Prescribed Meclizine, to be taken up to three times daily as needed for dizziness  - Advised to perform the Epley maneuver at home over the weekend and monitor response  - Provided a work note  - If no improvement by Monday, referral to physical therapy will be considered    2. Elevated blood pressure  - Blood pressure elevated at 148/96 today  - Advised to continue taking lisinopril 10 mg, but start with half a tablet (5 mg) daily in the morning  - Should monitor blood pressure at home, taking readings once daily and recording them in MyChart  - Blood pressure should be measured at least 30 minutes after taking the medication  -  Dosage may be adjusted if blood pressure remains elevated    3. Medication management  - Refill for vitamin D provided, to be taken once weekly  - Due for a physical examination with Bertha and advised to schedule this appointment       Discussed Care Gaps, ordered referrals and encouraged vaccination updates.       - Pt agrees with plan of care and denies further questions/concerns today  - This document is intended for medical expert use only. Persons  reading this document without medical staff guidance may result in misinterpretation and unintended morbidity     Go to the ER for any possible life-threatening symptoms such as chest pain or shortness of air.      Please allow 3-5 business days for recommendations based on new results      I personally spent time with this patient, preparing for the visit, reviewing tests, obtaining and/or reviewing a separately obtained history, performing a medically appropriate examination and/or evaluation, counseling and educating the patient/family/caregiver, ordering medications,  documenting information in the medical record and indepentently interpreting results.

## 2025-02-07 NOTE — PATIENT INSTRUCTIONS
